# Patient Record
Sex: FEMALE | Race: WHITE
[De-identification: names, ages, dates, MRNs, and addresses within clinical notes are randomized per-mention and may not be internally consistent; named-entity substitution may affect disease eponyms.]

---

## 2018-06-18 ENCOUNTER — HOSPITAL ENCOUNTER (EMERGENCY)
Dept: HOSPITAL 17 - PHED | Age: 33
Discharge: HOME | End: 2018-06-18
Payer: COMMERCIAL

## 2018-06-18 VITALS
RESPIRATION RATE: 18 BRPM | OXYGEN SATURATION: 97 % | DIASTOLIC BLOOD PRESSURE: 62 MMHG | SYSTOLIC BLOOD PRESSURE: 131 MMHG | HEART RATE: 69 BPM

## 2018-06-18 VITALS
SYSTOLIC BLOOD PRESSURE: 164 MMHG | RESPIRATION RATE: 16 BRPM | TEMPERATURE: 97.7 F | OXYGEN SATURATION: 97 % | DIASTOLIC BLOOD PRESSURE: 85 MMHG | HEART RATE: 80 BPM

## 2018-06-18 VITALS — HEIGHT: 62 IN | BODY MASS INDEX: 43.82 KG/M2 | WEIGHT: 238.1 LBS

## 2018-06-18 VITALS — SYSTOLIC BLOOD PRESSURE: 141 MMHG | DIASTOLIC BLOOD PRESSURE: 62 MMHG

## 2018-06-18 VITALS — OXYGEN SATURATION: 97 %

## 2018-06-18 DIAGNOSIS — N13.2: Primary | ICD-10-CM

## 2018-06-18 DIAGNOSIS — R11.2: ICD-10-CM

## 2018-06-18 DIAGNOSIS — R31.9: ICD-10-CM

## 2018-06-18 LAB
ALBUMIN SERPL-MCNC: 4 GM/DL (ref 3.4–5)
ALP SERPL-CCNC: 102 U/L (ref 45–117)
ALT SERPL-CCNC: 29 U/L (ref 10–53)
AST SERPL-CCNC: 21 U/L (ref 15–37)
BASOPHILS # BLD AUTO: 0.1 TH/MM3 (ref 0–0.2)
BASOPHILS NFR BLD: 1.3 % (ref 0–2)
BILIRUB SERPL-MCNC: 0.7 MG/DL (ref 0.2–1)
BUN SERPL-MCNC: 12 MG/DL (ref 7–18)
CALCIUM SERPL-MCNC: 8.7 MG/DL (ref 8.5–10.1)
CHLORIDE SERPL-SCNC: 109 MEQ/L (ref 98–107)
COLOR UR: YELLOW
CREAT SERPL-MCNC: 0.63 MG/DL (ref 0.5–1)
EOSINOPHIL # BLD: 0.2 TH/MM3 (ref 0–0.4)
EOSINOPHIL NFR BLD: 2.4 % (ref 0–4)
ERYTHROCYTE [DISTWIDTH] IN BLOOD BY AUTOMATED COUNT: 12.5 % (ref 11.6–17.2)
GFR SERPLBLD BASED ON 1.73 SQ M-ARVRAT: 108 ML/MIN (ref 89–?)
GLUCOSE SERPL-MCNC: 103 MG/DL (ref 74–106)
GLUCOSE UR STRIP-MCNC: (no result) MG/DL
HCO3 BLD-SCNC: 24 MEQ/L (ref 21–32)
HCT VFR BLD CALC: 41.2 % (ref 35–46)
HGB BLD-MCNC: 14 GM/DL (ref 11.6–15.3)
HGB UR QL STRIP: (no result)
INR PPP: 1.1 RATIO
KETONES UR STRIP-MCNC: (no result) MG/DL
LEUKOCYTE ESTERASE UR QL STRIP: (no result) /HPF (ref 0–5)
LYMPHOCYTES # BLD AUTO: 1.2 TH/MM3 (ref 1–4.8)
LYMPHOCYTES NFR BLD AUTO: 15.3 % (ref 9–44)
MCH RBC QN AUTO: 30.2 PG (ref 27–34)
MCHC RBC AUTO-ENTMCNC: 34 % (ref 32–36)
MCV RBC AUTO: 89 FL (ref 80–100)
MONOCYTE #: 0.3 TH/MM3 (ref 0–0.9)
MONOCYTES NFR BLD: 3.8 % (ref 0–8)
NEUTROPHILS # BLD AUTO: 5.8 TH/MM3 (ref 1.8–7.7)
NEUTROPHILS NFR BLD AUTO: 77.2 % (ref 16–70)
NITRITE UR QL STRIP: (no result)
PLATELET # BLD: 290 TH/MM3 (ref 150–450)
PMV BLD AUTO: 8.4 FL (ref 7–11)
PROT SERPL-MCNC: 7.8 GM/DL (ref 6.4–8.2)
PROTHROMBIN TIME: 11.3 SEC (ref 9.8–11.6)
RBC # BLD AUTO: 4.63 MIL/MM3 (ref 4–5.3)
RBC #/AREA URNS HPF: (no result) /HPF (ref 0–3)
SODIUM SERPL-SCNC: 140 MEQ/L (ref 136–145)
SP GR UR STRIP: (no result) (ref 1–1.03)
SQUAMOUS #/AREA URNS HPF: (no result) /HPF (ref 0–5)
URINE LEUKOCYTE ESTERASE: (no result)
WBC # BLD AUTO: 7.6 TH/MM3 (ref 4–11)

## 2018-06-18 PROCEDURE — 96374 THER/PROPH/DIAG INJ IV PUSH: CPT

## 2018-06-18 PROCEDURE — 80053 COMPREHEN METABOLIC PANEL: CPT

## 2018-06-18 PROCEDURE — 96375 TX/PRO/DX INJ NEW DRUG ADDON: CPT

## 2018-06-18 PROCEDURE — 85025 COMPLETE CBC W/AUTO DIFF WBC: CPT

## 2018-06-18 PROCEDURE — 84703 CHORIONIC GONADOTROPIN ASSAY: CPT

## 2018-06-18 PROCEDURE — 96361 HYDRATE IV INFUSION ADD-ON: CPT

## 2018-06-18 PROCEDURE — 85610 PROTHROMBIN TIME: CPT

## 2018-06-18 PROCEDURE — 83690 ASSAY OF LIPASE: CPT

## 2018-06-18 PROCEDURE — 85730 THROMBOPLASTIN TIME PARTIAL: CPT

## 2018-06-18 PROCEDURE — 74176 CT ABD & PELVIS W/O CONTRAST: CPT

## 2018-06-18 PROCEDURE — 99284 EMERGENCY DEPT VISIT MOD MDM: CPT

## 2018-06-18 PROCEDURE — 81001 URINALYSIS AUTO W/SCOPE: CPT

## 2018-06-18 NOTE — PD
HPI


Chief Complaint:  Flank/Kidney Pain


Time Seen by Provider:  10:59


Travel History


International Travel<30 days:  No


Contact w/Intl Traveler<30days:  No


Traveled to known affect area:  No





History of Present Illness


HPI


Patient is a 32-year-old female with no past medical history, presents the 

emergency room complaints of left-sided flank pain.  Patient reports that she 

woke up this morning with intermittent left-sided flank pain which radiates her 

groin.  Patient reports that nothing makes pain better or worse, when she does 

have this onset of pain, and last for a few minutes and then resolved on its 

own.  Patient reports associated nausea and vomiting with her symptoms, no 

fever or chills.  Patient has noticed that her urine is darker, questionable 

for hematuria.  Patient denies history of kidney stones though she thinks that 

she may have had them in the past which were undiagnosed.  Patient denies any 

trauma to her back.  Patient denies dysuria, denies urinary urgency or 

frequency.





PFSH


Past Medical History


Medical History:  Denies Significant Hx


Pregnant?:  Not Pregnant


LMP:  has IUD no menses





Past Surgical History


Surgical History:  No Previous Surgery





Social History


Alcohol Use:  No


Tobacco Use:  No


Substance Use:  No





Allergies-Medications


(Allergen,Severity, Reaction):  


Coded Allergies:  


     No Known Allergies (Unverified , 6/18/18)


Reported Meds & Prescriptions





Reported Meds & Active Scripts


Active


Reported


Cetirizine (Cetirizine HCl) 10 Mg Tab 10 Mg PO DAILY


Zorvolex (Diclofenac) 35 Mg Cap 75 Mg PO BID


Flonase Nasal Spray (Fluticasone Nasal Spray) 50 Mcg/Act Spray 50 Mcg EACH NARE 

BID








Review of Systems


General / Constitutional:  No: Fever


Eyes:  No: Visual changes


HENT:  No: Headaches


Cardiovascular:  No: Chest Pain or Discomfort


Respiratory:  No: Shortness of Breath


Gastrointestinal:  Positive: Nausea, Vomiting, No: Abdominal Pain


Genitourinary:  Positive: Hematuria, Flank Pain, No: Urgency, Frequency, Dysuria


Musculoskeletal:  No: Pain


Skin:  No Rash


Neurologic:  No: Weakness


Psychiatric:  No: Depression


Endocrine:  No: Polydipsia


Hematologic/Lymphatic:  No: Easy Bruising





Physical Exam


Narrative


GENERAL: mild distress


SKIN: Focused skin assessment warm/dry.


HEAD: Atraumatic. Normocephalic. 


EYES: Pupils equal and round. No scleral icterus. No injection or drainage. 


ENT: No nasal bleeding or discharge.  Mucous membranes pink and moist.


NECK: Trachea midline. No JVD. 


CARDIOVASCULAR: Regular rate and rhythm.  No murmur appreciated.


RESPIRATORY: No accessory muscle use. Clear to auscultation. Breath sounds 

equal bilaterally. 


GASTROINTESTINAL: Abdomen soft, non-tender, nondistended. Hepatic and splenic 

margins not palpable. Patient with left sided flank pain


MUSCULOSKELETAL: No obvious deformities. No clubbing.  No cyanosis.  No edema. 


NEUROLOGICAL: Awake and alert. No obvious cranial nerve deficits.  Motor 

grossly within normal limits. Normal speech.


PSYCHIATRIC: Appropriate mood and affect; insight and judgment normal.





Data


Data


Last Documented VS





Vital Signs








  Date Time  Temp Pulse Resp B/P (MAP) Pulse Ox O2 Delivery O2 Flow Rate FiO2


 


6/18/18 11:43     97 Room Air  


 


6/18/18 11:42  69 18     


 


6/18/18 10:52 97.7       








Orders





 Orders


Urinalysis - C+S If Indicated (6/18/18 10:49)


Ed Urine Pregnancytest Poc (6/18/18 10:49)


Complete Blood Count With Diff (6/18/18 11:07)


Comprehensive Metabolic Panel (6/18/18 11:07)


Lipase (6/18/18 11:07)


Prothrombin Time / Inr (Pt) (6/18/18 11:07)


Act Partial Throm Time (Ptt) (6/18/18 11:07)


Ct Abd/Pel W/O Iv Contrast (6/18/18 11:07)


Iv Access Insert/Monitor (6/18/18 11:07)


Ecg Monitoring (6/18/18 11:07)


Oximetry (6/18/18 11:07)


Sodium Chlor 0.9% 1000 Ml Inj (Ns 1000 M (6/18/18 11:07)


Sodium Chloride 0.9% Flush (Ns Flush) (6/18/18 11:15)


Ketorolac Inj (Toradol Inj) (6/18/18 11:15)


Metoclopramide Inj (Reglan Inj) (6/18/18 11:15)





Labs





Laboratory Tests








Test


  6/18/18


11:03 6/18/18


11:25


 


Urine Color YELLOW  


 


Urine Turbidity CLOUDY  


 


Urine pH 5.5  


 


Urine Specific Gravity


  GREATER/EQUAL


1.030 


 


 


Urine Protein 30 mg/dL  


 


Urine Glucose (UA) NEG mg/dL  


 


Urine Ketones NEG mg/dL  


 


Urine Occult Blood LARGE  


 


Urine Nitrite NEG  


 


Urine Bilirubin NEG  


 


Urine Urobilinogen  mg/dL  


 


Urine Leukocyte Esterase NEG  


 


Urine -200 /hpf  


 


Urine WBC 0-2 /hpf  


 


Urine Squamous Epithelial


Cells 0-5 /hpf 


  


 


 


Microscopic Urinalysis Comment


  CULT NOT


INDICATED 


 


 


White Blood Count  7.6 TH/MM3 


 


Red Blood Count  4.63 MIL/MM3 


 


Hemoglobin  14.0 GM/DL 


 


Hematocrit  41.2 % 


 


Mean Corpuscular Volume  89.0 FL 


 


Mean Corpuscular Hemoglobin  30.2 PG 


 


Mean Corpuscular Hemoglobin


Concent 


  34.0 % 


 


 


Red Cell Distribution Width  12.5 % 


 


Platelet Count  290 TH/MM3 


 


Mean Platelet Volume  8.4 FL 


 


Neutrophils (%) (Auto)  77.2 % 


 


Lymphocytes (%) (Auto)  15.3 % 


 


Monocytes (%) (Auto)  3.8 % 


 


Eosinophils (%) (Auto)  2.4 % 


 


Basophils (%) (Auto)  1.3 % 


 


Neutrophils # (Auto)  5.8 TH/MM3 


 


Lymphocytes # (Auto)  1.2 TH/MM3 


 


Monocytes # (Auto)  0.3 TH/MM3 


 


Eosinophils # (Auto)  0.2 TH/MM3 


 


Basophils # (Auto)  0.1 TH/MM3 


 


CBC Comment  DIFF FINAL 


 


Differential Comment   


 


Prothrombin Time  11.3 SEC 


 


Prothromb Time International


Ratio 


  1.1 RATIO 


 


 


Activated Partial


Thromboplast Time 


  28.5 SEC 


 


 


Blood Urea Nitrogen  12 MG/DL 


 


Creatinine  0.63 MG/DL 


 


Random Glucose  103 MG/DL 


 


Total Protein  7.8 GM/DL 


 


Albumin  4.0 GM/DL 


 


Calcium Level  8.7 MG/DL 


 


Alkaline Phosphatase  102 U/L 


 


Aspartate Amino Transf


(AST/SGOT) 


  21 U/L 


 


 


Alanine Aminotransferase


(ALT/SGPT) 


  29 U/L 


 


 


Total Bilirubin  0.7 MG/DL 


 


Sodium Level  140 MEQ/L 


 


Potassium Level  4.9 MEQ/L 


 


Chloride Level  109 MEQ/L 


 


Carbon Dioxide Level  24.0 MEQ/L 


 


Anion Gap  7 MEQ/L 


 


Estimat Glomerular Filtration


Rate 


  108 ML/MIN 


 


 


Lipase  99 U/L 











[place]


Medical Decision Making


Medical Screen Exam Complete:  Yes


Emergency Medical Condition:  Yes


Medical Record Reviewed:  Yes


Interpretation(s)





Vital Signs








  Date Time  Temp Pulse Resp B/P (MAP) Pulse Ox O2 Delivery O2 Flow Rate FiO2


 


6/18/18 10:52 97.7 80 16 164/85 (111 97   








Differential Diagnosis


Kidney stone, pyelonephritis, muscle skeletal pain


Narrative Course


During the course of the patients emergency department visit, the patients 

history, examination, and differential diagnosis were reviewed with the 

patient. The patient was placed on a cardiac monitor with oximetry and frequent 

blood pressure monitoring. The patient had an IV access obtained and blood work 

sent for analysis. 





The patient was initially provided IVF, IV reglan and IV toradol





The patients laboratory studies were reviewed and remarkable for:








Laboratory Tests








Test


  6/18/18


11:03 6/18/18


11:25


 


Urine Color


  YELLOW


(YELLW/STRAW) 


 


 


Urine Turbidity CLOUDY (CLEAR)  


 


Urine pH 5.5 (5.0-8.5)  


 


Urine Specific Gravity


  GREATER/EQUAL


1.030 


 


 


Urine Protein


  30 mg/dL


(NEG-TRACE) 


 


 


Urine Glucose (UA)


  NEG mg/dL


(NEG) 


 


 


Urine Ketones


  NEG mg/dL


(NEG) 


 


 


Urine Occult Blood LARGE (NEG)  


 


Urine Nitrite NEG (NEG)  


 


Urine Bilirubin NEG (NEG)  


 


Urine Urobilinogen


  mg/dL (LESS


THAN 2) 


 


 


Urine Leukocyte Esterase NEG (NEG)  


 


Urine RBC


  100-200 /hpf


(0-3) 


 


 


Urine WBC 0-2 /hpf (0-5)  


 


Urine Squamous Epithelial


Cells 0-5 /hpf (0-5) 


  


 


 


Microscopic Urinalysis Comment


  CULT NOT


INDICATED 


 


 


White Blood Count


  


  7.6 TH/MM3


(4.0-11.0)


 


Red Blood Count


  


  4.63 MIL/MM3


(4.00-5.30)


 


Hemoglobin


  


  14.0 GM/DL


(11.6-15.3)


 


Hematocrit


  


  41.2 %


(35.0-46.0)


 


Mean Corpuscular Volume


  


  89.0 FL


(80.0-100.0)


 


Mean Corpuscular Hemoglobin


  


  30.2 PG


(27.0-34.0)


 


Mean Corpuscular Hemoglobin


Concent 


  34.0 %


(32.0-36.0)


 


Red Cell Distribution Width


  


  12.5 %


(11.6-17.2)


 


Platelet Count


  


  290 TH/MM3


(150-450)


 


Mean Platelet Volume


  


  8.4 FL


(7.0-11.0)


 


Neutrophils (%) (Auto)


  


  77.2 %


(16.0-70.0)


 


Lymphocytes (%) (Auto)


  


  15.3 %


(9.0-44.0)


 


Monocytes (%) (Auto)


  


  3.8 %


(0.0-8.0)


 


Eosinophils (%) (Auto)


  


  2.4 %


(0.0-4.0)


 


Basophils (%) (Auto)


  


  1.3 %


(0.0-2.0)


 


Neutrophils # (Auto)


  


  5.8 TH/MM3


(1.8-7.7)


 


Lymphocytes # (Auto)


  


  1.2 TH/MM3


(1.0-4.8)


 


Monocytes # (Auto)


  


  0.3 TH/MM3


(0-0.9)


 


Eosinophils # (Auto)


  


  0.2 TH/MM3


(0-0.4)


 


Basophils # (Auto)


  


  0.1 TH/MM3


(0-0.2)


 


CBC Comment  DIFF FINAL 


 


Differential Comment   


 


Prothrombin Time


  


  11.3 SEC


(9.8-11.6)


 


Prothromb Time International


Ratio 


  1.1 RATIO 


 


 


Activated Partial


Thromboplast Time 


  28.5 SEC


(24.3-30.1)


 


Blood Urea Nitrogen


  


  12 MG/DL


(7-18)


 


Creatinine


  


  0.63 MG/DL


(0.50-1.00)


 


Random Glucose


  


  103 MG/DL


()


 


Total Protein


  


  7.8 GM/DL


(6.4-8.2)


 


Albumin


  


  4.0 GM/DL


(3.4-5.0)


 


Calcium Level


  


  8.7 MG/DL


(8.5-10.1)


 


Alkaline Phosphatase


  


  102 U/L


()


 


Aspartate Amino Transf


(AST/SGOT) 


  21 U/L (15-37) 


 


 


Alanine Aminotransferase


(ALT/SGPT) 


  29 U/L (10-53) 


 


 


Total Bilirubin


  


  0.7 MG/DL


(0.2-1.0)


 


Sodium Level


  


  140 MEQ/L


(136-145)


 


Potassium Level


  


  4.9 MEQ/L


(3.5-5.1)


 


Chloride Level


  


  109 MEQ/L


()


 


Carbon Dioxide Level


  


  24.0 MEQ/L


(21.0-32.0)


 


Anion Gap  7 MEQ/L (5-15) 


 


Estimat Glomerular Filtration


Rate 


  108 ML/MIN


(>89)


 


Lipase


  


  99 U/L


()











Radiology studies were reviewed and remarkable for








Last Impressions








Abdomen/Pelvis CT 6/18/18 1107 Signed





Impressions: 





 CONCLUSION:





 1.  There is a 5 mm stone in the proximal left ureter at the left UPJ causing s





 ome hydronephrosis of the left collecting system.





 2.  No evidence of any right-sided calcified kidney stones or hydronephrosis.





  





 








Labs and all studies were reviewed, patient's BUN 12, creatinine 0.63, UA is 

positive for large blood, 100-200 red blood cells, 0 to white blood cells, 

negative nitrites, negative leuk esterase





CT the abdomen and pelvis shows a 5 mm stone in the proximal left ureter at the 

left UPJ causing some hydronephrosis of the left collecting system,





Patient reports that she is feeling much better after the IV Toradol was 

administered, patient reports relief of pain at this time.  I did review all 

labs and studies as well as all incidental findings with patient, she was given 

a copy of her blood work as well as her radiology studies as she will bring 

this back to her general practitioner when she goes home from vacation 

tomorrow.  Signs and symptoms of when to return to the emergency room was 

reviewed with patient in detail.  Patient understands not to drive or operate 

heavy machinery while taking narcotic pain medications.





Diagnosis





 Primary Impression:  


 Kidney stone on left side


 Additional Impressions:  


 Hydronephrosis


 Qualified Codes:  N13.2 - Hydronephrosis with renal and ureteral calculous 

obstruction


 Hematuria


 Qualified Codes:  R31.9 - Hematuria, unspecified


Patient Instructions:  General Instructions





***Additional Instructions:  


**Please provide patient with a copy of their lab work and studies at discharge*

*





Please follow up with your primary care doctor in 2-3 days





Return to the ER if symptoms worsen or progress





Return to the ER as needed





Please strain your urine





Do not drive or operate heavy machinery while taking narcotic pain medications





Please follow-up with a urologist, bring the kidney stone to your appointment


***Med/Other Pt SpecificInfo:  Prescription(s) given


Scripts


Ibuprofen (Ibuprofen) 600 Mg Tab


600 MG PO Q6H Y for Pain/Inflammation, #40 TAB 0 Refills


   Prov: Ruma Wu DO         6/18/18 


Oxycodone-Acetaminophen (Percocet) 5-325 mg Tab


1 TAB PO Q6H Y for PAIN, #12 TAB 0 Refills


   Prov: Ruma Wu DO         6/18/18 


Tamsulosin (Flomax) 0.4 Mg Cap


0.4 MG PO HS for Manage Prostate Problems, #10 CAP 0 Refills


   Prov: Ruma Wu DO         6/18/18


Disposition:  01 DISCHARGE HOME


Condition:  Stable











Ruma Wu DO Jun 18, 2018 11:13

## 2018-06-18 NOTE — RADRPT
EXAM DATE:  6/18/2018 12:04 PM EDT

AGE/SEX:        32 years / Female



INDICATIONS:  Left flank pain since last night.



CLINICAL DATA:  This is the patient's initial encounter. Patient reports that signs and symptoms have
 been present for 2 days and indicates a pain score of 6/10. 

                                                                          

MEDICAL/SURGICAL HISTORY:       None. None.



RADIATION DOSE:  23.09 CTDI (mGy)









COMPARISON:      No prior exams available for comparison. 





TECHNIQUE:  Multiple contiguous axial images were obtained through the abdomen. Images were obtained 
using multiple row detector helical technique. Using dose reduction techniques, radiation dose was ke
pt as low as reasonably achievable to obtain optimal diagnostic quality images. Lack of IV contrast l
imits the diagnosis for certain organ pathology.



FINDINGS: 

Lower Lungs: The visualized lower lungs are clear.

Liver: The liver has a homogeneous density without space-occupying lesion. There is no dilation of th
e biliary tree.

Spleen:  Homogeneous density without enlargement.

Pancreas:  Unremarkable without mass or calcification.

Kidneys:  There is mild hydronephrosis of the left kidney. There is a 5 mm stone in the proximal left
 ureter at the left UPJ. No calcified right renal stones are seen. There is a tiny 1-2 mm stone lower
 pole left kidney not causing obstruction. There is no right-sided hydronephrosis. The distal ureters
 are nondilated.

Adrenal Glands:   Unremarkable.

Aorta:   The aorta and proximal iliac vessels are grossly unremarkable without aneurysmal dilation.

Bowel/Mesentery:  The bowel loops are grossly unremarkable. The cecum and sigmoid colon have a normal
 configuration. No inflammatory changes. There is stool throughout the colon. The appendix is unremar
kable.

Abdominal Wall:  Intact.

Retroperitoneum:  No evidence of adenopathy in the retrocrural, para-aortic, or deep pelvic regions.

Bladder:  Contours are smooth. No calcified stones.

Reproductive Organs:  There is an IUD in the uterus.

Inguinal:  The inguinal region is unremarkable without evidence of adenopathy.

Bony Structures:  Unremarkable.



CONCLUSION:

1.  There is a 5 mm stone in the proximal left ureter at the left UPJ causing some hydronephrosis of 
the left collecting system.

2.  No evidence of any right-sided calcified kidney stones or hydronephrosis.



Electronically signed by: Niko Garland MD  6/18/2018 12:19 PM EDT